# Patient Record
Sex: FEMALE | Race: BLACK OR AFRICAN AMERICAN | NOT HISPANIC OR LATINO
[De-identification: names, ages, dates, MRNs, and addresses within clinical notes are randomized per-mention and may not be internally consistent; named-entity substitution may affect disease eponyms.]

---

## 2018-03-07 PROBLEM — Z00.00 ENCOUNTER FOR PREVENTIVE HEALTH EXAMINATION: Status: ACTIVE | Noted: 2018-03-07

## 2018-04-23 ENCOUNTER — RECORD ABSTRACTING (OUTPATIENT)
Age: 63
End: 2018-04-23

## 2018-04-25 ENCOUNTER — APPOINTMENT (OUTPATIENT)
Dept: CARDIOLOGY | Facility: CLINIC | Age: 63
End: 2018-04-25
Payer: COMMERCIAL

## 2018-04-25 ENCOUNTER — NON-APPOINTMENT (OUTPATIENT)
Age: 63
End: 2018-04-25

## 2018-04-25 VITALS
HEART RATE: 93 BPM | OXYGEN SATURATION: 98 % | DIASTOLIC BLOOD PRESSURE: 80 MMHG | BODY MASS INDEX: 30 KG/M2 | WEIGHT: 163 LBS | SYSTOLIC BLOOD PRESSURE: 116 MMHG | HEIGHT: 62 IN

## 2018-04-25 DIAGNOSIS — Z80.3 FAMILY HISTORY OF MALIGNANT NEOPLASM OF BREAST: ICD-10-CM

## 2018-04-25 DIAGNOSIS — Z82.49 FAMILY HISTORY OF ISCHEMIC HEART DISEASE AND OTHER DISEASES OF THE CIRCULATORY SYSTEM: ICD-10-CM

## 2018-04-25 PROCEDURE — 93000 ELECTROCARDIOGRAM COMPLETE: CPT

## 2018-04-25 PROCEDURE — 99243 OFF/OP CNSLTJ NEW/EST LOW 30: CPT

## 2018-04-25 RX ORDER — ROSUVASTATIN CALCIUM 20 MG/1
20 TABLET, FILM COATED ORAL
Qty: 90 | Refills: 0 | Status: ACTIVE | COMMUNITY
Start: 2017-12-11

## 2018-04-25 RX ORDER — LISINOPRIL 10 MG/1
10 TABLET ORAL
Qty: 90 | Refills: 0 | Status: ACTIVE | COMMUNITY
Start: 2017-01-06

## 2018-04-25 RX ORDER — HYDROCHLOROTHIAZIDE 12.5 MG/1
12.5 CAPSULE ORAL
Qty: 30 | Refills: 0 | Status: ACTIVE | COMMUNITY
Start: 2017-01-06

## 2018-05-17 ENCOUNTER — APPOINTMENT (OUTPATIENT)
Dept: CARDIOLOGY | Facility: CLINIC | Age: 63
End: 2018-05-17
Payer: COMMERCIAL

## 2018-05-17 PROCEDURE — 93979 VASCULAR STUDY: CPT

## 2018-05-17 PROCEDURE — 93880 EXTRACRANIAL BILAT STUDY: CPT

## 2018-05-17 PROCEDURE — 93306 TTE W/DOPPLER COMPLETE: CPT

## 2018-05-22 ENCOUNTER — APPOINTMENT (OUTPATIENT)
Dept: CARDIOLOGY | Facility: CLINIC | Age: 63
End: 2018-05-22
Payer: COMMERCIAL

## 2018-05-22 PROCEDURE — 78452 HT MUSCLE IMAGE SPECT MULT: CPT

## 2018-05-22 PROCEDURE — A9502: CPT

## 2018-05-22 PROCEDURE — 93015 CV STRESS TEST SUPVJ I&R: CPT

## 2018-05-23 ENCOUNTER — APPOINTMENT (OUTPATIENT)
Dept: CARDIOLOGY | Facility: CLINIC | Age: 63
End: 2018-05-23
Payer: COMMERCIAL

## 2018-05-23 VITALS
BODY MASS INDEX: 29.81 KG/M2 | WEIGHT: 162 LBS | DIASTOLIC BLOOD PRESSURE: 78 MMHG | HEIGHT: 62 IN | HEART RATE: 80 BPM | SYSTOLIC BLOOD PRESSURE: 128 MMHG

## 2018-05-23 PROCEDURE — 99214 OFFICE O/P EST MOD 30 MIN: CPT

## 2019-05-22 ENCOUNTER — APPOINTMENT (OUTPATIENT)
Dept: CARDIOLOGY | Facility: CLINIC | Age: 64
End: 2019-05-22

## 2019-07-11 ENCOUNTER — APPOINTMENT (OUTPATIENT)
Dept: CARDIOLOGY | Facility: CLINIC | Age: 64
End: 2019-07-11
Payer: COMMERCIAL

## 2019-07-11 ENCOUNTER — NON-APPOINTMENT (OUTPATIENT)
Age: 64
End: 2019-07-11

## 2019-07-11 VITALS
BODY MASS INDEX: 29.45 KG/M2 | HEART RATE: 100 BPM | SYSTOLIC BLOOD PRESSURE: 114 MMHG | OXYGEN SATURATION: 98 % | DIASTOLIC BLOOD PRESSURE: 74 MMHG | WEIGHT: 161 LBS

## 2019-07-11 PROCEDURE — 99214 OFFICE O/P EST MOD 30 MIN: CPT

## 2019-07-11 PROCEDURE — 93000 ELECTROCARDIOGRAM COMPLETE: CPT

## 2019-07-11 NOTE — PHYSICAL EXAM
[General Appearance - Well Developed] : well developed [Normal Appearance] : normal appearance [Well Groomed] : well groomed [General Appearance - Well Nourished] : well nourished [No Deformities] : no deformities [General Appearance - In No Acute Distress] : no acute distress [Normal Conjunctiva] : the conjunctiva exhibited no abnormalities [Eyelids - No Xanthelasma] : the eyelids demonstrated no xanthelasmas [Normal Oral Mucosa] : normal oral mucosa [No Oral Pallor] : no oral pallor [No Oral Cyanosis] : no oral cyanosis [Normal Jugular Venous V Waves Present] : normal jugular venous V waves present [Normal Jugular Venous A Waves Present] : normal jugular venous A waves present [No Jugular Venous Valentin A Waves] : no jugular venous valentin A waves [Respiration, Rhythm And Depth] : normal respiratory rhythm and effort [Auscultation Breath Sounds / Voice Sounds] : lungs were clear to auscultation bilaterally [Exaggerated Use Of Accessory Muscles For Inspiration] : no accessory muscle use [Heart Rate And Rhythm] : heart rate and rhythm were normal [Murmurs] : no murmurs present [Heart Sounds] : normal S1 and S2 [Abdomen Soft] : soft [Abdomen Tenderness] : non-tender [Abdomen Mass (___ Cm)] : no abdominal mass palpated [Abnormal Walk] : normal gait [Gait - Sufficient For Exercise Testing] : the gait was sufficient for exercise testing [Nail Clubbing] : no clubbing of the fingernails [Cyanosis, Localized] : no localized cyanosis [Petechial Hemorrhages (___cm)] : no petechial hemorrhages [Skin Color & Pigmentation] : normal skin color and pigmentation [] : no rash [No Venous Stasis] : no venous stasis [Skin Lesions] : no skin lesions [No Skin Ulcers] : no skin ulcer [No Xanthoma] : no  xanthoma was observed [Oriented To Time, Place, And Person] : oriented to person, place, and time [Affect] : the affect was normal [Mood] : the mood was normal [No Anxiety] : not feeling anxious

## 2019-07-11 NOTE — ASSESSMENT
[FreeTextEntry1] : Yvonne is a 64-year-old female with medical history detailed above and active medical issues including:\par \par - No anginal symptoms, normal perfusion Myoview stress test normal LVEF May 2018 \par \par - Hypertension at BP goal less than 130/80 on HCTZ lisinopril\par \par - Dyslipidemia on rosuvastatin well tolerated\par \par - Type 2 diabetes on  Kombiglyze\par \par - Chronic joint pain on the Motrin\par \par Patient will be seen in cardiology follow-up one year at patient request. Current cardiac medications remain unchanged. Repeat labs will be ordered with PMD.\par \par Yvonne will followup with Dr Eagle Mercado for primary care.\par \par

## 2019-07-11 NOTE — REASON FOR VISIT
[Consultation] : a consultation regarding [Hyperlipidemia] : hyperlipidemia [Hypertension] : hypertension [FreeTextEntry1] : Yvonne is 64-year-old female with history of hypertension, dyslipidemia, type 2 diabetes, GERD, chest pain evaluation 2011 normal MPI stress test at that time.\par \par No further chest pain over the past year. Cardiovascular review of symptoms is negative for exertional chest pain, dyspnea, palpitations, dizziness or syncope.  No PND or orthopnea leg edema.  No bleeding or black stool.\par \par Patient is walking 15 minutes with exertional chest pain.  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 \par \par Exercise Myoview stress test May 2018, LVEF 60%, normal perfusion, no ischemic EKG response, no chest pain, 103% MPHR, 5 minutes Corbin protocol.\par \par Echocardiogram May 2018, LVEF 60%, mild MR, mild to moderate TR, mild pulmonary hypertension, RVSP 35 mmHg\par \par Carotid and abdominal ultrasound May 2018, mild nonobstructive plaque, normal abdominal aortic size.\par \par EKG 4/25/18, sinus rhythm, low voltage, NSST\par \par Exercise Myoview stress test 2011 normal perfusion\par \par \par  [FreeTextEntry2] : T2DM

## 2019-09-05 ENCOUNTER — APPOINTMENT (OUTPATIENT)
Dept: CARDIOLOGY | Facility: CLINIC | Age: 64
End: 2019-09-05
Payer: COMMERCIAL

## 2019-09-05 PROCEDURE — 93306 TTE W/DOPPLER COMPLETE: CPT

## 2019-09-09 ENCOUNTER — APPOINTMENT (OUTPATIENT)
Dept: CARDIOLOGY | Facility: CLINIC | Age: 64
End: 2019-09-09

## 2020-07-15 ENCOUNTER — NON-APPOINTMENT (OUTPATIENT)
Age: 65
End: 2020-07-15

## 2020-07-15 ENCOUNTER — APPOINTMENT (OUTPATIENT)
Dept: CARDIOLOGY | Facility: CLINIC | Age: 65
End: 2020-07-15
Payer: COMMERCIAL

## 2020-07-15 VITALS
HEART RATE: 78 BPM | TEMPERATURE: 98.3 F | HEIGHT: 62 IN | SYSTOLIC BLOOD PRESSURE: 110 MMHG | WEIGHT: 155 LBS | BODY MASS INDEX: 28.52 KG/M2 | DIASTOLIC BLOOD PRESSURE: 70 MMHG | OXYGEN SATURATION: 100 %

## 2020-07-15 PROCEDURE — 99214 OFFICE O/P EST MOD 30 MIN: CPT

## 2020-07-15 PROCEDURE — 93000 ELECTROCARDIOGRAM COMPLETE: CPT

## 2020-07-15 RX ORDER — SAXAGLIPTIN AND METFORMIN HYDROCHLORIDE 2.5; 1 MG/1; MG/1
2.5-1 TABLET, FILM COATED, EXTENDED RELEASE ORAL
Qty: 180 | Refills: 0 | Status: DISCONTINUED | COMMUNITY
Start: 2017-01-06 | End: 2020-07-15

## 2020-07-15 NOTE — PHYSICAL EXAM
[General Appearance - Well Developed] : well developed [Normal Appearance] : normal appearance [General Appearance - Well Nourished] : well nourished [No Deformities] : no deformities [Well Groomed] : well groomed [Normal Conjunctiva] : the conjunctiva exhibited no abnormalities [General Appearance - In No Acute Distress] : no acute distress [Normal Oral Mucosa] : normal oral mucosa [Eyelids - No Xanthelasma] : the eyelids demonstrated no xanthelasmas [No Oral Pallor] : no oral pallor [No Oral Cyanosis] : no oral cyanosis [Normal Jugular Venous A Waves Present] : normal jugular venous A waves present [No Jugular Venous Valentin A Waves] : no jugular venous valentin A waves [Normal Jugular Venous V Waves Present] : normal jugular venous V waves present [Exaggerated Use Of Accessory Muscles For Inspiration] : no accessory muscle use [Respiration, Rhythm And Depth] : normal respiratory rhythm and effort [Heart Rate And Rhythm] : heart rate and rhythm were normal [Auscultation Breath Sounds / Voice Sounds] : lungs were clear to auscultation bilaterally [Heart Sounds] : normal S1 and S2 [Abdomen Soft] : soft [Murmurs] : no murmurs present [Abdomen Tenderness] : non-tender [Gait - Sufficient For Exercise Testing] : the gait was sufficient for exercise testing [Abdomen Mass (___ Cm)] : no abdominal mass palpated [Abnormal Walk] : normal gait [Nail Clubbing] : no clubbing of the fingernails [Cyanosis, Localized] : no localized cyanosis [Petechial Hemorrhages (___cm)] : no petechial hemorrhages [Skin Color & Pigmentation] : normal skin color and pigmentation [] : no rash [No Venous Stasis] : no venous stasis [Skin Lesions] : no skin lesions [No Skin Ulcers] : no skin ulcer [No Xanthoma] : no  xanthoma was observed [Oriented To Time, Place, And Person] : oriented to person, place, and time [Affect] : the affect was normal [Mood] : the mood was normal [No Anxiety] : not feeling anxious

## 2020-07-15 NOTE — ASSESSMENT
[FreeTextEntry1] : Yvonne is a 65-year-old female with medical history detailed above and active medical issues including:\par \par - No anginal symptoms, normal perfusion Myoview stress test normal LVEF May 2018.  In the setting of Covid 19 pandemic we will discuss the need for stress testing next office visit.\par \par - Hypertension at BP goal less than 130/80 on HCTZ lisinopril\par \par - Dyslipidemia on rosuvastatin well tolerated\par \par - Type 2 diabetes on  Kombiglyze\par \par - Chronic joint pain on the Motrin\par \par Patient will be seen in cardiology follow-up 6 months.  Patient will have 2-D echocardiogram to assess for  LV systolic function, wall motion and  structural heart disease.  Carotid and abdominal ultrasound to assess for obstructive PAD.  \par \par Current cardiac medications remain unchanged. Repeat labs will be ordered with PMD.\par \par Yvonne will followup with Talat Lane for primary care.\par \par

## 2020-07-15 NOTE — REASON FOR VISIT
[Hyperlipidemia] : hyperlipidemia [Consultation] : a consultation regarding [Hypertension] : hypertension [FreeTextEntry1] : Yvonne is 65-year-old female with history of hypertension, dyslipidemia, type 2 diabetes, GERD, chest pain evaluation 2011 normal MPI stress test at that time.\par \par No further chest pain over the past year. Cardiovascular review of symptoms is negative for exertional chest pain, dyspnea, palpitations, dizziness or syncope.  No PND or orthopnea leg edema.  No bleeding or black stool.\par \par Patient is walking 15 minutes with exertional chest pain.  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 \par \par Echocardiogram September 2019, LVEF 55%, mild MR and TR, normal RVSP\par \par Exercise Myoview stress test May 2018, LVEF 60%, normal perfusion, no ischemic EKG response, no chest pain, 103% MPHR, 5 minutes Corbin protocol.\par \par Echocardiogram May 2018, LVEF 60%, mild MR, mild to moderate TR, mild pulmonary hypertension, RVSP 35 mmHg\par \par Carotid and abdominal ultrasound May 2018, mild nonobstructive plaque, normal abdominal aortic size.\par \par EKG 4/25/18, sinus rhythm, low voltage, NSST\par \par Exercise Myoview stress test 2011 normal perfusion\par \par \par  [FreeTextEntry2] : HTN, HL, T2DM, ACP

## 2021-01-06 ENCOUNTER — APPOINTMENT (OUTPATIENT)
Dept: CARDIOLOGY | Facility: CLINIC | Age: 66
End: 2021-01-06
Payer: COMMERCIAL

## 2021-01-06 PROCEDURE — 93306 TTE W/DOPPLER COMPLETE: CPT

## 2021-01-06 PROCEDURE — 99072 ADDL SUPL MATRL&STAF TM PHE: CPT

## 2021-01-06 PROCEDURE — 93880 EXTRACRANIAL BILAT STUDY: CPT

## 2021-01-06 PROCEDURE — 93979 VASCULAR STUDY: CPT

## 2021-01-12 ENCOUNTER — APPOINTMENT (OUTPATIENT)
Dept: CARDIOLOGY | Facility: CLINIC | Age: 66
End: 2021-01-12
Payer: COMMERCIAL

## 2021-01-12 VITALS
BODY MASS INDEX: 30.18 KG/M2 | HEIGHT: 62 IN | HEART RATE: 89 BPM | WEIGHT: 164 LBS | OXYGEN SATURATION: 96 % | SYSTOLIC BLOOD PRESSURE: 138 MMHG | DIASTOLIC BLOOD PRESSURE: 70 MMHG

## 2021-01-12 PROCEDURE — 99214 OFFICE O/P EST MOD 30 MIN: CPT

## 2021-01-12 PROCEDURE — 99072 ADDL SUPL MATRL&STAF TM PHE: CPT

## 2021-01-12 NOTE — REASON FOR VISIT
[Consultation] : a consultation regarding [Hyperlipidemia] : hyperlipidemia [Hypertension] : hypertension [FreeTextEntry2] : HTN, HL, T2DM, ACP [FreeTextEntry1] : Yvonne is 65-year-old female with history of hypertension, dyslipidemia, type 2 diabetes, GERD, chest pain evaluation 2011 normal MPI stress test at that time.\par \par No further chest pain over the past year. Cardiovascular review of symptoms is negative for exertional chest pain, dyspnea, palpitations, dizziness or syncope.  No PND or orthopnea leg edema.  No bleeding or black stool.\par \par Patient is walking 15 minutes with exertional chest pain.  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 \par \par Echocardiogram Jan 2021, LVEF 55%, mild MR, normal RVSP\par \par Carotid Doppler Jan 2021, R ICA 50 to 69% stenosis, LICA nonobstructive, normal abdominal aortic size\par \par Echocardiogram September 2019, LVEF 55%, mild MR and TR, normal RVSP\par \par Exercise Myoview stress test May 2018, LVEF 60%, normal perfusion, no ischemic EKG response, no chest pain, 103% MPHR, 5 minutes Corbin protocol.\par \par Echocardiogram May 2018, LVEF 60%, mild MR, mild to moderate TR, mild pulmonary hypertension, RVSP 35 mmHg\par \par Carotid and abdominal ultrasound May 2018, mild nonobstructive plaque, normal abdominal aortic size.\par \par EKG 4/25/18, sinus rhythm, low voltage, NSST\par \par Exercise Myoview stress test 2011 normal perfusion\par \par \par

## 2021-01-12 NOTE — PHYSICAL EXAM
[General Appearance - Well Developed] : well developed [Normal Appearance] : normal appearance [Well Groomed] : well groomed [General Appearance - Well Nourished] : well nourished [No Deformities] : no deformities [General Appearance - In No Acute Distress] : no acute distress [Normal Conjunctiva] : the conjunctiva exhibited no abnormalities [Eyelids - No Xanthelasma] : the eyelids demonstrated no xanthelasmas [Normal Oral Mucosa] : normal oral mucosa [No Oral Pallor] : no oral pallor [No Oral Cyanosis] : no oral cyanosis [Normal Jugular Venous A Waves Present] : normal jugular venous A waves present [Normal Jugular Venous V Waves Present] : normal jugular venous V waves present [No Jugular Venous Valentin A Waves] : no jugular venous valentin A waves [Respiration, Rhythm And Depth] : normal respiratory rhythm and effort [Exaggerated Use Of Accessory Muscles For Inspiration] : no accessory muscle use [Auscultation Breath Sounds / Voice Sounds] : lungs were clear to auscultation bilaterally [Heart Rate And Rhythm] : heart rate and rhythm were normal [Heart Sounds] : normal S1 and S2 [Murmurs] : no murmurs present [Abdomen Soft] : soft [Abdomen Tenderness] : non-tender [Abdomen Mass (___ Cm)] : no abdominal mass palpated [Abnormal Walk] : normal gait [Gait - Sufficient For Exercise Testing] : the gait was sufficient for exercise testing [Nail Clubbing] : no clubbing of the fingernails [Cyanosis, Localized] : no localized cyanosis [Petechial Hemorrhages (___cm)] : no petechial hemorrhages [Skin Color & Pigmentation] : normal skin color and pigmentation [] : no rash [No Venous Stasis] : no venous stasis [Skin Lesions] : no skin lesions [No Skin Ulcers] : no skin ulcer [No Xanthoma] : no  xanthoma was observed [Oriented To Time, Place, And Person] : oriented to person, place, and time [Affect] : the affect was normal [Mood] : the mood was normal [No Anxiety] : not feeling anxious

## 2021-01-12 NOTE — ASSESSMENT
[FreeTextEntry1] : Yvonne is a 65-year-old female with medical history detailed above and active medical issues including:\par \par - No anginal symptoms, normal perfusion Myoview stress test normal LVEF May 2018.  In the setting of Covid 19 pandemic we will discuss the need for stress testing next office visit.\par \par - Hypertension at BP goal less than 130/80 on HCTZ lisinopril\par \par - Dyslipidemia on rosuvastatin well tolerated\par \par - Type 2 diabetes on  Kombiglyze\par \par -  Asymptomatic moderate BLANE stenosis Doppler January 2021.  Contrast CTA carotids ordered. \par \par - Chronic joint pain on the Motrin\par \par Patient will be seen in cardiology follow-up 6 months.  \par \par Current cardiac medications remain unchanged. Repeat labs will be ordered with PMD.\par \par Advised patient to follow active lifestyle with regular cardiovascular exercise. Patient educated on lifestyle and diet modification with low sodium low fat diet and avoidance of excessive alcohol. Patient is aware to call with any symptoms or concerns.\par \par Yvonne will followup with Talat Lane for primary care.\par \par

## 2021-02-10 ENCOUNTER — NON-APPOINTMENT (OUTPATIENT)
Age: 66
End: 2021-02-10

## 2021-07-08 ENCOUNTER — APPOINTMENT (OUTPATIENT)
Dept: CARDIOLOGY | Facility: CLINIC | Age: 66
End: 2021-07-08
Payer: COMMERCIAL

## 2021-07-08 ENCOUNTER — NON-APPOINTMENT (OUTPATIENT)
Age: 66
End: 2021-07-08

## 2021-07-08 VITALS
HEART RATE: 81 BPM | OXYGEN SATURATION: 99 % | WEIGHT: 162 LBS | DIASTOLIC BLOOD PRESSURE: 70 MMHG | SYSTOLIC BLOOD PRESSURE: 114 MMHG | TEMPERATURE: 98.2 F | HEIGHT: 62 IN | BODY MASS INDEX: 29.81 KG/M2

## 2021-07-08 PROCEDURE — 93000 ELECTROCARDIOGRAM COMPLETE: CPT

## 2021-07-08 PROCEDURE — 99214 OFFICE O/P EST MOD 30 MIN: CPT

## 2021-07-08 PROCEDURE — 99072 ADDL SUPL MATRL&STAF TM PHE: CPT

## 2021-07-08 NOTE — ASSESSMENT
[FreeTextEntry1] : Yvonne is a 66-year-old female with medical history detailed above and active medical issues including:\par \par - No anginal symptoms, normal perfusion Myoview stress test normal LVEF May 2018.  In the setting of Covid 19 pandemic we will discuss the need for stress testing next office visit.\par \par - Hypertension at guideline goal on HCTZ lisinopril\par \par - Dyslipidemia on rosuvastatin well tolerated\par \par - Type 2 diabetes on  Kombiglyze\par \par -  Asymptomatic moderate BLAEN stenosis Doppler January 2021.  Nonobstructive carotid on contrast CTA February 2021, incidental finding left multiple thyroid nodules, thyroid ultrasound and follow-up with endocrinologist Dr. Ivette Rodriguez ordered\par \par - Chronic joint pain on the Motrin\par \par Patient will be seen in cardiology follow-up 6 months.  We will discuss the need for stress testing next office visit.\par \par Current cardiac medications remain unchanged. Repeat labs will be ordered with PMD.\par \par Advised patient to follow active lifestyle with regular cardiovascular exercise. Patient educated on lifestyle and diet modification with low sodium low fat diet and avoidance of excessive alcohol. Patient is aware to call with any symptoms or concerns.\par \par Yvonne will followup with Talat Lane for primary care.\par \par

## 2021-07-08 NOTE — PHYSICAL EXAM
[General Appearance - Well Developed] : well developed [Normal Appearance] : normal appearance [Well Groomed] : well groomed [General Appearance - Well Nourished] : well nourished [No Deformities] : no deformities [General Appearance - In No Acute Distress] : no acute distress [Eyelids - No Xanthelasma] : the eyelids demonstrated no xanthelasmas [Normal Oral Mucosa] : normal oral mucosa [No Oral Pallor] : no oral pallor [No Oral Cyanosis] : no oral cyanosis [Normal Jugular Venous A Waves Present] : normal jugular venous A waves present [Normal Jugular Venous V Waves Present] : normal jugular venous V waves present [No Jugular Venous Valentin A Waves] : no jugular venous valentin A waves [Respiration, Rhythm And Depth] : normal respiratory rhythm and effort [Exaggerated Use Of Accessory Muscles For Inspiration] : no accessory muscle use [Auscultation Breath Sounds / Voice Sounds] : lungs were clear to auscultation bilaterally [Heart Rate And Rhythm] : heart rate and rhythm were normal [Heart Sounds] : normal S1 and S2 [Murmurs] : no murmurs present [Abdomen Soft] : soft [Abdomen Tenderness] : non-tender [Abdomen Mass (___ Cm)] : no abdominal mass palpated [Abnormal Walk] : normal gait [Gait - Sufficient For Exercise Testing] : the gait was sufficient for exercise testing [Nail Clubbing] : no clubbing of the fingernails [Cyanosis, Localized] : no localized cyanosis [Petechial Hemorrhages (___cm)] : no petechial hemorrhages [Skin Color & Pigmentation] : normal skin color and pigmentation [] : no rash [No Venous Stasis] : no venous stasis [Skin Lesions] : no skin lesions [No Skin Ulcers] : no skin ulcer [No Xanthoma] : no  xanthoma was observed [Oriented To Time, Place, And Person] : oriented to person, place, and time [Affect] : the affect was normal [Mood] : the mood was normal [No Anxiety] : not feeling anxious [Well Developed] : well developed [Well Nourished] : well nourished [No Acute Distress] : no acute distress [Normal Conjunctiva] : normal conjunctiva [Normal Venous Pressure] : normal venous pressure [No Carotid Bruit] : no carotid bruit [Normal S1, S2] : normal S1, S2 [No Murmur] : no murmur [No Rub] : no rub [No Gallop] : no gallop [Clear Lung Fields] : clear lung fields [Good Air Entry] : good air entry [No Respiratory Distress] : no respiratory distress  [Soft] : abdomen soft [Non Tender] : non-tender [No Masses/organomegaly] : no masses/organomegaly [Normal Bowel Sounds] : normal bowel sounds [Normal Gait] : normal gait [No Edema] : no edema [No Cyanosis] : no cyanosis [No Clubbing] : no clubbing [No Varicosities] : no varicosities [No Rash] : no rash [No Skin Lesions] : no skin lesions [Moves all extremities] : moves all extremities [No Focal Deficits] : no focal deficits [Normal Speech] : normal speech [Alert and Oriented] : alert and oriented [Normal memory] : normal memory

## 2021-07-08 NOTE — REASON FOR VISIT
[Consultation] : a consultation regarding [Hyperlipidemia] : hyperlipidemia [Hypertension] : hypertension [Other: ____] : [unfilled] [FreeTextEntry1] : Yvonne is 66-year-old female with history of hypertension, dyslipidemia, type 2 diabetes, GERD, chest pain evaluation 2011 normal MPI stress test at that time.\par \par No further chest pain over the past year. Cardiovascular review of symptoms is negative for exertional chest pain, dyspnea, palpitations, dizziness or syncope.  No PND or orthopnea leg edema.  No bleeding or black stool.\par \par Patient is walking 15 minutes with exertional chest pain.  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  Patient will be spending the summer in [place] with her family. \par \par CTA carotids Feb 2021 mild nonobstructive plaque, left thyroid multiple nodules, thyroid Doppler and follow-up with endocrinologist Dr Ivette Rodriguez ordered. \par \par Echocardiogram Jan 2021, LVEF 55%, mild MR, normal RVSP\par \par Carotid Doppler Jan 2021, R ICA 50 to 69% stenosis, LICA nonobstructive, normal abdominal aortic size\par \par Echocardiogram September 2019, LVEF 55%, mild MR and TR, normal RVSP\par \par Exercise Myoview stress test May 2018, LVEF 60%, normal perfusion, no ischemic EKG response, no chest pain, 103% MPHR, 5 minutes Corbin protocol.\par \par Echocardiogram May 2018, LVEF 60%, mild MR, mild to moderate TR, mild pulmonary hypertension, RVSP 35 mmHg\par \par Carotid and abdominal ultrasound May 2018, mild nonobstructive plaque, normal abdominal aortic size.\par \par EKG 4/25/18, sinus rhythm, low voltage, NSST\par \par Exercise Myoview stress test 2011 normal perfusion\par \par \par  [FreeTextEntry2] : HTN, HL, T2DM, ACP

## 2022-01-25 ENCOUNTER — APPOINTMENT (OUTPATIENT)
Dept: CARDIOLOGY | Facility: CLINIC | Age: 67
End: 2022-01-25
Payer: COMMERCIAL

## 2022-01-25 VITALS
BODY MASS INDEX: 30.36 KG/M2 | TEMPERATURE: 97.6 F | SYSTOLIC BLOOD PRESSURE: 124 MMHG | HEIGHT: 62 IN | HEART RATE: 97 BPM | DIASTOLIC BLOOD PRESSURE: 70 MMHG | WEIGHT: 165 LBS | OXYGEN SATURATION: 100 %

## 2022-01-25 PROCEDURE — 99214 OFFICE O/P EST MOD 30 MIN: CPT

## 2022-01-25 NOTE — ASSESSMENT
[FreeTextEntry1] : Yvonne is a 66-year-old female with medical history detailed above and active medical issues including:\par \par - Recurrent left arm pain, multiple CAD risk factors.  Patient will have noninvasive testing with a Lexiscan Myoview stress test to assess for obstructive CAD,  echocardiogram for LVEF, wall motion, structural heart disease,  carotid and abdominal ultrasound to assess for obstructive PAD. \par \par - Hypertension average resting home BPs at guideline goal on HCTZ lisinopril\par \par - Dyslipidemia on rosuvastatin well tolerated\par \par - Type 2 diabetes on  Kombiglyze\par \par -  Asymptomatic moderate BLANE stenosis Doppler January 2021.  Nonobstructive carotid on contrast CTA February 2021, incidental finding left multiple thyroid nodules, thyroid ultrasound and follow-up with endocrinologist Dr. Ivette Rodriguez ordered\par \par - Chronic joint pain on the Motrin\par \par Patient will be seen in cardiology follow-up after noninvasive testing.  \par \par Current cardiac medications remain unchanged. Repeat labs will be ordered with PMD.\par \par Advised patient to follow active lifestyle with regular cardiovascular exercise. Patient educated on lifestyle and diet modification with low sodium low fat diet and avoidance of excessive alcohol. Patient is aware to call with any symptoms or concerns.\par \par Yvonne will followup with Talat Lane for primary care.\par \par

## 2022-01-25 NOTE — PHYSICAL EXAM
[Well Developed] : well developed [Well Nourished] : well nourished [No Acute Distress] : no acute distress [Normal Venous Pressure] : normal venous pressure [No Carotid Bruit] : no carotid bruit [Normal S1, S2] : normal S1, S2 [No Murmur] : no murmur [No Rub] : no rub [No Gallop] : no gallop [Clear Lung Fields] : clear lung fields [Good Air Entry] : good air entry [No Respiratory Distress] : no respiratory distress  [Soft] : abdomen soft [Non Tender] : non-tender [No Masses/organomegaly] : no masses/organomegaly [Normal Bowel Sounds] : normal bowel sounds [Normal Gait] : normal gait [No Edema] : no edema [No Cyanosis] : no cyanosis [No Clubbing] : no clubbing [No Varicosities] : no varicosities [No Rash] : no rash [No Skin Lesions] : no skin lesions [Moves all extremities] : moves all extremities [No Focal Deficits] : no focal deficits [Normal Speech] : normal speech [Alert and Oriented] : alert and oriented [Normal memory] : normal memory [General Appearance - Well Developed] : well developed [Normal Appearance] : normal appearance [Well Groomed] : well groomed [General Appearance - Well Nourished] : well nourished [No Deformities] : no deformities [General Appearance - In No Acute Distress] : no acute distress [Normal Conjunctiva] : the conjunctiva exhibited no abnormalities [Eyelids - No Xanthelasma] : the eyelids demonstrated no xanthelasmas [Normal Oral Mucosa] : normal oral mucosa [No Oral Pallor] : no oral pallor [No Oral Cyanosis] : no oral cyanosis [Normal Jugular Venous A Waves Present] : normal jugular venous A waves present [Normal Jugular Venous V Waves Present] : normal jugular venous V waves present [No Jugular Venous Valentin A Waves] : no jugular venous valentin A waves [Respiration, Rhythm And Depth] : normal respiratory rhythm and effort [Exaggerated Use Of Accessory Muscles For Inspiration] : no accessory muscle use [Auscultation Breath Sounds / Voice Sounds] : lungs were clear to auscultation bilaterally [Heart Rate And Rhythm] : heart rate and rhythm were normal [Heart Sounds] : normal S1 and S2 [Murmurs] : no murmurs present [Abdomen Soft] : soft [Abdomen Tenderness] : non-tender [Abdomen Mass (___ Cm)] : no abdominal mass palpated [Abnormal Walk] : normal gait [Gait - Sufficient For Exercise Testing] : the gait was sufficient for exercise testing [Nail Clubbing] : no clubbing of the fingernails [Cyanosis, Localized] : no localized cyanosis [Petechial Hemorrhages (___cm)] : no petechial hemorrhages [Skin Color & Pigmentation] : normal skin color and pigmentation [] : no rash [No Venous Stasis] : no venous stasis [Skin Lesions] : no skin lesions [No Skin Ulcers] : no skin ulcer [No Xanthoma] : no  xanthoma was observed [Oriented To Time, Place, And Person] : oriented to person, place, and time [Affect] : the affect was normal [Mood] : the mood was normal [No Anxiety] : not feeling anxious

## 2022-01-25 NOTE — REASON FOR VISIT
[Other: ____] : [unfilled] [Consultation] : a consultation regarding [Hyperlipidemia] : hyperlipidemia [Hypertension] : hypertension [FreeTextEntry1] : Yvonne is 66-year-old female with history of hypertension, dyslipidemia, type 2 diabetes, GERD, chest pain evaluation 2011 normal MPI stress test at that time.\par \par Patient has recurrent left arm pain, no further chest pain. Cardiovascular review of symptoms is negative for exertional dyspnea, palpitations, dizziness or syncope.  No PND or orthopnea leg edema.  No bleeding or black stool.\par \par No exercise routine.  Patient is walking 10 minutes on occasion. 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  Patient spends the summer in [place] with her family. \par \par CTA carotids Feb 2021 mild nonobstructive plaque, left thyroid multiple nodules, thyroid Doppler and follow-up with endocrinologist Dr Ivette Rodriguez ordered. \par \par Echocardiogram Jan 2021, LVEF 55%, mild MR, normal RVSP\par \par Carotid Doppler Jan 2021, R ICA 50 to 69% stenosis, LICA nonobstructive, normal abdominal aortic size\par \par Echocardiogram September 2019, LVEF 55%, mild MR and TR, normal RVSP\par \par Exercise Myoview stress test May 2018, LVEF 60%, normal perfusion, no ischemic EKG response, no chest pain, 103% MPHR, 5 minutes Corbin protocol.\par \par Echocardiogram May 2018, LVEF 60%, mild MR, mild to moderate TR, mild pulmonary hypertension, RVSP 35 mmHg\par \par Carotid and abdominal ultrasound May 2018, mild nonobstructive plaque, normal abdominal aortic size.\par \par EKG 4/25/18, sinus rhythm, low voltage, NSST\par \par Exercise Myoview stress test 2011 normal perfusion\par \par \par  [FreeTextEntry2] : HTN, HL, T2DM, ACP

## 2022-01-25 NOTE — REVIEW OF SYSTEMS
[Negative] : Heme/Lymph [Dyspnea on exertion] : dyspnea during exertion [FreeTextEntry5] : Left arm pain

## 2022-01-31 ENCOUNTER — APPOINTMENT (OUTPATIENT)
Dept: CARDIOLOGY | Facility: CLINIC | Age: 67
End: 2022-01-31
Payer: COMMERCIAL

## 2022-01-31 PROCEDURE — 93979 VASCULAR STUDY: CPT

## 2022-01-31 PROCEDURE — 93306 TTE W/DOPPLER COMPLETE: CPT

## 2022-01-31 PROCEDURE — 93880 EXTRACRANIAL BILAT STUDY: CPT

## 2022-02-08 ENCOUNTER — APPOINTMENT (OUTPATIENT)
Dept: CARDIOLOGY | Facility: CLINIC | Age: 67
End: 2022-02-08
Payer: COMMERCIAL

## 2022-02-08 PROCEDURE — A9502: CPT

## 2022-02-08 PROCEDURE — 93015 CV STRESS TEST SUPVJ I&R: CPT

## 2022-02-08 PROCEDURE — 78452 HT MUSCLE IMAGE SPECT MULT: CPT

## 2022-02-17 ENCOUNTER — APPOINTMENT (OUTPATIENT)
Dept: CARDIOLOGY | Facility: CLINIC | Age: 67
End: 2022-02-17
Payer: COMMERCIAL

## 2022-02-17 VITALS
WEIGHT: 170 LBS | SYSTOLIC BLOOD PRESSURE: 114 MMHG | OXYGEN SATURATION: 99 % | BODY MASS INDEX: 31.28 KG/M2 | DIASTOLIC BLOOD PRESSURE: 68 MMHG | HEIGHT: 62 IN | TEMPERATURE: 97.5 F | HEART RATE: 99 BPM

## 2022-02-17 PROCEDURE — 99214 OFFICE O/P EST MOD 30 MIN: CPT

## 2022-02-17 NOTE — REASON FOR VISIT
[Other: ____] : [unfilled] [Consultation] : a consultation regarding [Hyperlipidemia] : hyperlipidemia [Hypertension] : hypertension [FreeTextEntry1] : Yvonne is 67-year-old female with history of hypertension, dyslipidemia, type 2 diabetes, GERD, chest pain evaluation 2011 normal MPI stress test at that time.\par \par Patient has recurrent left arm pain, no further chest pain. Cardiovascular review of symptoms is negative for exertional dyspnea, palpitations, dizziness or syncope.  No PND or orthopnea leg edema.  No bleeding or black stool.\par \par No exercise routine.  Patient is walking 10 minutes on occasion. 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  Patient spends the summer in [place] with her family.  Patient is planning detention this year.\par \par Lexiscan Myoview stress test Feb 2022, LVEF 66%, normal perfusion, diaphragm attenuation seen, nonischemic EKG response, baseline sinus rhythm NSST\par \par Echocardiogram Jan 2022 LVEF 55%, mild MR and TR, normal RVSP, ascending aorta 3.5 cm.\par \par Carotid and abdominal ultrasound Jan 2022, mild nonobstructive plaque, normal abdominal aortic size. \par \par CTA carotids Feb 2021 mild nonobstructive plaque, left thyroid multiple nodules, thyroid Doppler and follow-up with endocrinologist Dr Ivette Rodriguez ordered. \par \par Echocardiogram Jan 2021, LVEF 55%, mild MR, normal RVSP\par \par Carotid Doppler Jan 2021, R ICA 50 to 69% stenosis, LICA nonobstructive, normal abdominal aortic size\par \par Echocardiogram September 2019, LVEF 55%, mild MR and TR, normal RVSP\par \par Exercise Myoview stress test May 2018, LVEF 60%, normal perfusion, no ischemic EKG response, no chest pain, 103% MPHR, 5 minutes Corbin protocol.\par \par Echocardiogram May 2018, LVEF 60%, mild MR, mild to moderate TR, mild pulmonary hypertension, RVSP 35 mmHg\par \par Carotid and abdominal ultrasound May 2018, mild nonobstructive plaque, normal abdominal aortic size.\par \par EKG 4/25/18, sinus rhythm, low voltage, NSST\par \par Exercise Myoview stress test 2011 normal perfusion\par \par \par  [FreeTextEntry2] : HTN, HL, T2DM, ACP

## 2022-02-17 NOTE — PHYSICAL EXAM
[Well Developed] : well developed [Well Nourished] : well nourished [No Acute Distress] : no acute distress [Normal Venous Pressure] : normal venous pressure [No Carotid Bruit] : no carotid bruit [Normal S1, S2] : normal S1, S2 [No Murmur] : no murmur [No Rub] : no rub [Clear Lung Fields] : clear lung fields [No Gallop] : no gallop [Good Air Entry] : good air entry [No Respiratory Distress] : no respiratory distress  [Soft] : abdomen soft [Non Tender] : non-tender [No Masses/organomegaly] : no masses/organomegaly [Normal Bowel Sounds] : normal bowel sounds [Normal Gait] : normal gait [No Edema] : no edema [No Cyanosis] : no cyanosis [No Clubbing] : no clubbing [No Varicosities] : no varicosities [No Rash] : no rash [No Skin Lesions] : no skin lesions [Moves all extremities] : moves all extremities [No Focal Deficits] : no focal deficits [Normal Speech] : normal speech [Alert and Oriented] : alert and oriented [Normal memory] : normal memory [General Appearance - Well Developed] : well developed [Normal Appearance] : normal appearance [Well Groomed] : well groomed [General Appearance - Well Nourished] : well nourished [No Deformities] : no deformities [General Appearance - In No Acute Distress] : no acute distress [Normal Conjunctiva] : the conjunctiva exhibited no abnormalities [Eyelids - No Xanthelasma] : the eyelids demonstrated no xanthelasmas [Normal Oral Mucosa] : normal oral mucosa [No Oral Pallor] : no oral pallor [No Oral Cyanosis] : no oral cyanosis [Normal Jugular Venous A Waves Present] : normal jugular venous A waves present [Normal Jugular Venous V Waves Present] : normal jugular venous V waves present [No Jugular Venous Valentin A Waves] : no jugular venous valentin A waves [Respiration, Rhythm And Depth] : normal respiratory rhythm and effort [Exaggerated Use Of Accessory Muscles For Inspiration] : no accessory muscle use [Auscultation Breath Sounds / Voice Sounds] : lungs were clear to auscultation bilaterally [Heart Rate And Rhythm] : heart rate and rhythm were normal [Heart Sounds] : normal S1 and S2 [Murmurs] : no murmurs present [Abdomen Soft] : soft [Abdomen Tenderness] : non-tender [Abdomen Mass (___ Cm)] : no abdominal mass palpated [Abnormal Walk] : normal gait [Gait - Sufficient For Exercise Testing] : the gait was sufficient for exercise testing [Nail Clubbing] : no clubbing of the fingernails [Cyanosis, Localized] : no localized cyanosis [Petechial Hemorrhages (___cm)] : no petechial hemorrhages [Skin Color & Pigmentation] : normal skin color and pigmentation [] : no rash [No Venous Stasis] : no venous stasis [Skin Lesions] : no skin lesions [No Skin Ulcers] : no skin ulcer [No Xanthoma] : no  xanthoma was observed [Oriented To Time, Place, And Person] : oriented to person, place, and time [Affect] : the affect was normal [Mood] : the mood was normal [No Anxiety] : not feeling anxious

## 2022-02-17 NOTE — ASSESSMENT
[FreeTextEntry1] : Yvonne is a 67-year-old female with medical history detailed above and active medical issues including:\par \par - Atypical chest pain resolved, normal perfusion Myoview stress test with normal LVEF Feb 2022\par \par - Hypertension average resting home BPs at guideline goal on HCTZ lisinopril\par \par - Dyslipidemia on rosuvastatin well tolerated\par \par - Type 2 diabetes on  Kombiglyze\par \par -  Asymptomatic moderate BLANE stenosis Doppler January 2021.  Nonobstructive carotid on contrast CTA February 2021, incidental finding left multiple thyroid nodules, thyroid ultrasound and follow-up with endocrinologist Dr. Ivette Rodriguez ordered\par \par - Chronic joint pain on the Motrin\par \par Patient will be seen in cardiology follow-up 6 months\par \par \par Current cardiac medications remain unchanged. Repeat labs will be ordered with PMD.\par \par Advised patient to follow active lifestyle with regular cardiovascular exercise. Patient educated on lifestyle and diet modification with low sodium low fat diet and avoidance of excessive alcohol. Patient is aware to call with any symptoms or concerns.\par \par Yvonne will followup with Talat Lane for primary care.\par \par

## 2022-02-17 NOTE — REVIEW OF SYSTEMS
[Dyspnea on exertion] : dyspnea during exertion [Negative] : Heme/Lymph [FreeTextEntry5] : Left arm pain

## 2022-03-11 ENCOUNTER — NON-APPOINTMENT (OUTPATIENT)
Age: 67
End: 2022-03-11

## 2022-08-23 ENCOUNTER — APPOINTMENT (OUTPATIENT)
Dept: CARDIOLOGY | Facility: CLINIC | Age: 67
End: 2022-08-23

## 2022-08-23 ENCOUNTER — NON-APPOINTMENT (OUTPATIENT)
Age: 67
End: 2022-08-23

## 2022-08-23 VITALS
BODY MASS INDEX: 30.91 KG/M2 | HEIGHT: 62 IN | OXYGEN SATURATION: 99 % | SYSTOLIC BLOOD PRESSURE: 118 MMHG | HEART RATE: 97 BPM | WEIGHT: 168 LBS | DIASTOLIC BLOOD PRESSURE: 62 MMHG

## 2022-08-23 PROCEDURE — 93242 EXT ECG>48HR<7D RECORDING: CPT | Mod: 59

## 2022-08-23 PROCEDURE — 93000 ELECTROCARDIOGRAM COMPLETE: CPT | Mod: 59

## 2022-08-23 PROCEDURE — 99214 OFFICE O/P EST MOD 30 MIN: CPT | Mod: 25

## 2022-08-23 NOTE — HISTORY OF PRESENT ILLNESS
[FreeTextEntry1] : JEROMY LARSON is a 67 year old female with a past medical history of hypertension, dyslipidemia, type 2 diabetes, GERD, chest pain evaluation 2011 normal MPI stress test at that time.\par \par Last seen 2/17/22. In the interim there have been no hospitalizations or procedures. Notes brief palpitations. She denies chest pain, pressure, unusual shortness of breath, orthopnea, LE edema, lightheadedness, dizziness, near syncope or syncope. Never a smoker. Walks for exercise.\par \par Testing:\par \par EKG 8/23/22: SR at 94 bpm, VA interval 128ms, QTc 393ms, PRWP, nonspecific ST-T wave abnormalities \par \par Lexiscan Myoview stress test Feb 2022, LVEF 66%, normal perfusion, diaphragm attenuation seen, nonischemic EKG response, baseline sinus rhythm NSST\par \par Echo 1/31/22: EF 50-55%. Mild MR. Mild segmental LVSF. Hypokinetic apical lateral wall, hypokintetic distal inferiolateral wall. Mild TR. Minimal to mild VA. Compared with echo 1/6/21, mild segmental wall motion abnormalities noiced.\par \par Carotid and abdominal ultrasound Jan 2022, mild nonobstructive plaque, normal abdominal aortic size. \par \par Labs 6/21/21: WBC 5.79, Hgb 12.3, HCT 37.7, plt 213, A1C 6, Chol 149, Trigs 54, HDL 91, LDL 47, TSH 1.22\par \par CTA carotids Feb 2021 mild nonobstructive plaque, left thyroid multiple nodules, thyroid Doppler and follow-up with endocrinologist Dr Ivette Rodriguez ordered.

## 2022-08-23 NOTE — DISCUSSION/SUMMARY
[FreeTextEntry1] : JEROMY LARSON is a 67 year old F who presents today Aug 23, 2022 with the above history and the following active issues:\par \par Palpitations. Recommend 7 day Zio. Avoidance of triggers discussed.\par \par Fasting labs ordered.\par \par - Atypical chest pain resolved, normal perfusion Myoview stress test with normal LVEF Feb 2022\par \par - Hypertension average resting home BPs at guideline goal on HCTZ lisinopril\par \par - Dyslipidemia on rosuvastatin well tolerated\par \par - Type 2 diabetes on Kombiglyze\par \par - Asymptomatic moderate BLANE stenosis Doppler January 2021. Nonobstructive carotid on contrast CTA February 2021, incidental finding left multiple thyroid nodules, thyroid ultrasound and follow-up with endocrinologist Dr. Ivette Rodriguez ordered\par \par - Chronic joint pain on the Motrin\par \par Ongoing f/u with PCP.\par \par F/U after testing to review results (labs and Zio).\par Discussed red flag symptoms, which would warrant sooner or emergent medical evaluation.\par Any questions and concerns were addressed and resolved.\par \par Sincerely,\par Demi Mendoza [facility]-BC\par Patient's history, testing, and plan was reviewed with supervising physician, Dr. Nikia Bolanos

## 2022-09-20 ENCOUNTER — APPOINTMENT (OUTPATIENT)
Dept: CARDIOLOGY | Facility: CLINIC | Age: 67
End: 2022-09-20

## 2022-09-20 VITALS
OXYGEN SATURATION: 97 % | WEIGHT: 165 LBS | HEART RATE: 93 BPM | HEIGHT: 62 IN | BODY MASS INDEX: 30.36 KG/M2 | SYSTOLIC BLOOD PRESSURE: 108 MMHG | TEMPERATURE: 97.8 F | DIASTOLIC BLOOD PRESSURE: 66 MMHG

## 2022-09-20 PROCEDURE — 99215 OFFICE O/P EST HI 40 MIN: CPT

## 2022-09-20 NOTE — ASSESSMENT
[FreeTextEntry1] : Yvonne is a 67-year-old female with medical history detailed above and active medical issues including:\par \par - Atypical chest pain resolved, normal perfusion Myoview stress test with normal LVEF Feb 2022\par \par - Hypertension average resting home BPs at guideline goal on HCTZ lisinopril\par \par - Dyslipidemia on rosuvastatin well tolerated\par \par - Type 2 diabetes on  Kombiglyze\par \par -  Asymptomatic moderate BLANE stenosis Doppler January 2021.  Nonobstructive carotid on contrast CTA February 2021, incidental finding left multiple thyroid nodules, thyroid ultrasound and follow-up with endocrinologist Dr. Ivette Rodriguez ordered\par \par - Chronic joint pain on the Motrin\par \par Patient will be seen in cardiology follow-up 6 months same-day echocardiogram\par \par Current cardiac medications remain unchanged. Repeat labs will be ordered with PMD.\par \par Advised patient to follow active lifestyle with regular cardiovascular exercise. Patient educated on lifestyle and diet modification with low sodium low fat diet and avoidance of excessive alcohol. Patient is aware to call with any symptoms or concerns.\par \par Yvonne will followup with Talat Lane for primary care.\par \par

## 2022-09-20 NOTE — REASON FOR VISIT
[Other: ____] : [unfilled] [Consultation] : a consultation regarding [Hyperlipidemia] : hyperlipidemia [Hypertension] : hypertension [FreeTextEntry1] : Yvonne is 67-year-old female with history of hypertension, dyslipidemia, type 2 diabetes, GERD, chest pain evaluation 2011 normal MPI stress test at that time.\par \par No further chest pain. Cardiovascular review of symptoms is negative for exertional dyspnea, palpitations, dizziness or syncope.  No PND or orthopnea leg edema.  No bleeding or black stool.\par \par No exercise routine.  Patient is walking 10 minutes on occasion. Patient was working with geriatric patients in a  center, now retired.  Patient spends the summer in [place] with her family.  Patient is planning [employer]DC this year.\par \par Zio patch 1 week heart monitor Aug 2022 sinus rhythm average heart rate 78 bpm, rare PACs PVCs, 4 beat WCT\par \par Lexiscan Myoview stress test Feb 2022, LVEF 66%, normal perfusion, diaphragm attenuation seen, nonischemic EKG response, baseline sinus rhythm NSST\par \par Echocardiogram Jan 2022 LVEF 55%, mild MR and TR, normal RVSP, ascending aorta 3.5 cm.\par \par Carotid and abdominal ultrasound Jan 2022, mild nonobstructive plaque, normal abdominal aortic size. \par \par CTA carotids Feb 2021 mild nonobstructive plaque, left thyroid multiple nodules, thyroid Doppler and follow-up with endocrinologist Dr Ivette Rodriguez ordered. \par \par Echocardiogram Jan 2021, LVEF 55%, mild MR, normal RVSP\par \par Carotid Doppler Jan 2021, R ICA 50 to 69% stenosis, LICA nonobstructive, normal abdominal aortic size\par \par Echocardiogram September 2019, LVEF 55%, mild MR and TR, normal RVSP\par \par Exercise Myoview stress test May 2018, LVEF 60%, normal perfusion, no ischemic EKG response, no chest pain, 103% MPHR, 5 minutes Corbin protocol.\par \par Echocardiogram May 2018, LVEF 60%, mild MR, mild to moderate TR, mild pulmonary hypertension, RVSP 35 mmHg\par \par Carotid and abdominal ultrasound May 2018, mild nonobstructive plaque, normal abdominal aortic size.\par \par EKG 4/25/18, sinus rhythm, low voltage, NSST\par \par Exercise Myoview stress test 2011 normal perfusion\par \par \par  [FreeTextEntry2] : HTN, HL, T2DM, ACP

## 2023-03-07 ENCOUNTER — APPOINTMENT (OUTPATIENT)
Dept: CARDIOLOGY | Facility: CLINIC | Age: 68
End: 2023-03-07

## 2023-04-20 ENCOUNTER — APPOINTMENT (OUTPATIENT)
Dept: CARDIOLOGY | Facility: CLINIC | Age: 68
End: 2023-04-20
Payer: MEDICARE

## 2023-04-20 VITALS
SYSTOLIC BLOOD PRESSURE: 114 MMHG | OXYGEN SATURATION: 99 % | WEIGHT: 162 LBS | HEIGHT: 62 IN | BODY MASS INDEX: 29.81 KG/M2 | DIASTOLIC BLOOD PRESSURE: 64 MMHG | HEART RATE: 88 BPM

## 2023-04-20 PROCEDURE — 93306 TTE W/DOPPLER COMPLETE: CPT

## 2023-04-20 PROCEDURE — 99214 OFFICE O/P EST MOD 30 MIN: CPT

## 2023-04-20 RX ORDER — ASPIRIN ENTERIC COATED TABLETS 81 MG 81 MG/1
81 TABLET, DELAYED RELEASE ORAL DAILY
Qty: 30 | Refills: 3 | Status: ACTIVE | COMMUNITY
Start: 2023-04-20 | End: 1900-01-01

## 2023-04-20 RX ORDER — ERGOCALCIFEROL 1.25 MG/1
1.25 MG CAPSULE, LIQUID FILLED ORAL
Qty: 13 | Refills: 0 | Status: ACTIVE | COMMUNITY
Start: 2022-07-15

## 2023-04-20 NOTE — HISTORY OF PRESENT ILLNESS
[FreeTextEntry1] : JEROMY LARSON is a 68 year old female with a past medical history of hypertension, dyslipidemia, type 2 diabetes, GERD, and carotid dz..\par \par Last seen 9/20/22. Presents today for echo and OV to review. See details below. In the interim there have been no hospitalizations or procedures. She denies chest pain, pressure, palpitations, unusual shortness of breath, orthopnea, LE edema, lightheadedness, dizziness, near syncope or syncope. Never a smoker. Not on a formal exercise regimen.\par \par Testing:\par \par Echo 4/20/23: CONCLUSIONS:\par 1. The left ventricular systolic function is normal with an ejection fraction visually estimated at 55 to 60\par %.\par 2. There is mild (grade 1) left ventricular diastolic dysfunction.\par 3. Normal right ventricular cavity size, normal wall thickness and normal systolic function.\par 4. The left atrium is normal.\par 5. No echocardiographic evidence of pulmonary hypertension.\par 6. No pericardial effusion seen.\par 7. Compared to the transthoracic echocardiogram performed on 1/31/2022 normal LV systolic function is\par seen.\par ____________\par \par Nuke 2/8/22: Lexiscan. Negative.\par \par Labs 8/26/22: Na 138, K 4.2, Cr 0.55, Ca 9.6, Mag 1.8, AST 21, ALT 14, Trigs 54, , LDL 45, Chol 156, CPK 61, TSH 1.06, A1V 6.8, CRP 0.09, BNP 46.4, WBC 4.47, Hgb 12.9, HCT 38.8, plt 218, LPa <10.\par \par Zio 8/23/22: -8/31/22: SR  bpm, average HR 78 bpm, 4 beats WCT. PAC burden <1%. Rare V couplets and triplets. PVC burden <1%. +sx's with SR/ST.\par \par Carotid u/s 1/31/22: Nonobstructive. Elevated BL ICA velocities are most likely due to tortuosity.\par \par Abd u/s 1/31/22: No AAA. Mild plauqe.\par \par CTA carotids Feb 2021 mild nonobstructive plaque, left thyroid multiple nodules, thyroid Doppler and follow-up with endocrinologist Dr Ivette Rodriguez ordered. \par

## 2023-04-20 NOTE — DISCUSSION/SUMMARY
[FreeTextEntry1] : JEROMY LARSON is a 68 year old F who presents today Apr 20, 2023 with the above history and the following active issues:\par \par Hypertension average resting home BPs at guideline goal on HCTZ lisinopril\par \par Dyslipidemia on rosuvastatin well tolerated\par \par Type 2 diabetes following with endocrine, Dr. Guillermo Tyler.\par \par Asymptomatic moderate BLANE stenosis Doppler January 2021. Nonobstructive carotid on contrast CTA February 2021, incidental finding left multiple thyroid nodules, thyroid ultrasound and follow-up with endocrinologist Dr. Ivette Rodriguez ordered. Patient now following with Dr. Tyler. On ASA and statin.\par \par Patient will have fasting labs in 3 months and carotid u/s and OV same day in 6 months.\par \par Ongoing f/u with PCP.\par \par F/U as above.\par Discussed red flag symptoms, which would warrant sooner or emergent medical evaluation.\par Any questions and concerns were addressed and resolved.\par \par Sincerely,\par Demi Mendoza [facility]-BC\par Patient's history, testing, and plan was reviewed with supervising physician, Dr. Nikia Bolanos\par \par Chronic joint pain on the Motrin\par

## 2023-05-19 ENCOUNTER — RX CHANGE (OUTPATIENT)
Age: 68
End: 2023-05-19

## 2023-10-19 ENCOUNTER — APPOINTMENT (OUTPATIENT)
Dept: CARDIOLOGY | Facility: CLINIC | Age: 68
End: 2023-10-19
Payer: MEDICARE

## 2023-10-19 VITALS
DIASTOLIC BLOOD PRESSURE: 84 MMHG | HEIGHT: 62 IN | WEIGHT: 174 LBS | SYSTOLIC BLOOD PRESSURE: 128 MMHG | HEART RATE: 92 BPM | OXYGEN SATURATION: 98 % | BODY MASS INDEX: 32.02 KG/M2

## 2023-10-19 PROCEDURE — 93880 EXTRACRANIAL BILAT STUDY: CPT

## 2023-10-19 PROCEDURE — 99214 OFFICE O/P EST MOD 30 MIN: CPT

## 2023-12-01 ENCOUNTER — OFFICE (OUTPATIENT)
Dept: URBAN - METROPOLITAN AREA CLINIC 38 | Facility: CLINIC | Age: 68
Setting detail: OPHTHALMOLOGY
End: 2023-12-01
Payer: MEDICARE

## 2023-12-01 DIAGNOSIS — H17.9: ICD-10-CM

## 2023-12-01 DIAGNOSIS — H02.402: ICD-10-CM

## 2023-12-01 DIAGNOSIS — H01.004: ICD-10-CM

## 2023-12-01 DIAGNOSIS — H01.001: ICD-10-CM

## 2023-12-01 DIAGNOSIS — H16.043: ICD-10-CM

## 2023-12-01 DIAGNOSIS — H25.13: ICD-10-CM

## 2023-12-01 DIAGNOSIS — H16.223: ICD-10-CM

## 2023-12-01 DIAGNOSIS — E11.9: ICD-10-CM

## 2023-12-01 DIAGNOSIS — H04.223: ICD-10-CM

## 2023-12-01 PROCEDURE — 92004 COMPRE OPH EXAM NEW PT 1/>: CPT | Performed by: OPHTHALMOLOGY

## 2023-12-01 ASSESSMENT — REFRACTION_CURRENTRX
OD_OVR_VA: 20/
OS_OVR_VA: 20/
OS_CYLINDER: -0.75
OS_AXIS: 074
OD_SPHERE: +1.00
OS_VPRISM_DIRECTION: PROGS
OD_CYLINDER: -0.50
OD_ADD: +2.25
OD_AXIS: 128
OS_SPHERE: +0.25
OD_VPRISM_DIRECTION: PROGS
OS_ADD: +2.00

## 2023-12-01 ASSESSMENT — LID POSITION - COMMENTS: OS_COMMENTS: 2MM

## 2023-12-01 ASSESSMENT — REFRACTION_MANIFEST
OD_VA2: 20/30(J2)
OS_VA2: 20/30(J2)
OS_CYLINDER: -0.75
OS_VA1: 20/NI
OD_SPHERE: +1.00
OD_AXIS: 130
OS_ADD: +3.00
OD_CYLINDER: -0.50
OD_ADD: +3.00
OS_SPHERE: +0.25
OS_AXIS: 075
OD_VA1: 20/NI

## 2023-12-01 ASSESSMENT — SPHEQUIV_DERIVED
OS_SPHEQUIV: -0.125
OS_SPHEQUIV: -1
OD_SPHEQUIV: 0.25
OD_SPHEQUIV: 0.75

## 2023-12-01 ASSESSMENT — REFRACTION_AUTOREFRACTION
OD_SPHERE: +0.50
OD_CYLINDER: -0.50
OS_AXIS: 061
OS_SPHERE: -0.50
OS_CYLINDER: -1.00
OD_AXIS: 154

## 2023-12-01 ASSESSMENT — SUPERFICIAL PUNCTATE KERATITIS (SPK)
OD_SPK: 3+
OS_SPK: 3+

## 2023-12-01 ASSESSMENT — LID EXAM ASSESSMENTS
OD_BLEPHARITIS: RUL 1+
OS_BLEPHARITIS: LUL 1+

## 2023-12-01 ASSESSMENT — CONFRONTATIONAL VISUAL FIELD TEST (CVF)
OS_FINDINGS: FULL
OD_FINDINGS: FULL

## 2023-12-01 ASSESSMENT — INCREASING TEAR LAKE - SEVERITY SCORE
OD_INC_TEARLAKE: 1+
OS_INC_TEARLAKE: 1+

## 2023-12-01 ASSESSMENT — LID POSITION - PTOSIS: OS_PTOSIS: LUL 1+

## 2024-04-19 ENCOUNTER — RX ONLY (RX ONLY)
Age: 69
End: 2024-04-19

## 2024-04-19 ENCOUNTER — OFFICE (OUTPATIENT)
Dept: URBAN - METROPOLITAN AREA CLINIC 8 | Facility: CLINIC | Age: 69
Setting detail: OPHTHALMOLOGY
End: 2024-04-19
Payer: MEDICARE

## 2024-04-19 DIAGNOSIS — H16.223: ICD-10-CM

## 2024-04-19 DIAGNOSIS — H02.89: ICD-10-CM

## 2024-04-19 DIAGNOSIS — H40.033: ICD-10-CM

## 2024-04-19 DIAGNOSIS — H16.043: ICD-10-CM

## 2024-04-19 PROBLEM — H02.40 PTOSIS OF EYELID, UNSPECIFIED; LEFT EYE: Status: ACTIVE | Noted: 2024-04-19

## 2024-04-19 PROCEDURE — 99213 OFFICE O/P EST LOW 20 MIN: CPT | Performed by: OPTOMETRIST

## 2024-04-19 ASSESSMENT — LID EXAM ASSESSMENTS
OD_MEIBOMITIS: RUL 2+
OS_MEIBOMITIS: LUL 2+

## 2024-04-19 ASSESSMENT — LID POSITION - COMMENTS: OS_COMMENTS: 2MM

## 2024-04-19 ASSESSMENT — LID POSITION - PTOSIS: OS_PTOSIS: LUL 1+

## 2024-05-08 PROBLEM — I65.21 STENOSIS OF RIGHT CAROTID ARTERY: Status: ACTIVE | Noted: 2021-01-12

## 2024-05-08 PROBLEM — R00.2 PALPITATIONS: Status: ACTIVE | Noted: 2022-08-23

## 2024-05-08 PROBLEM — R07.89 BURNING IN THE CHEST: Status: ACTIVE | Noted: 2018-04-25

## 2024-05-08 PROBLEM — R06.02 SOB (SHORTNESS OF BREATH): Status: ACTIVE | Noted: 2018-04-25

## 2024-05-08 PROBLEM — E04.1 LEFT THYROID NODULE: Status: ACTIVE | Noted: 2021-07-08

## 2024-05-08 PROBLEM — R07.89 CHEST TIGHTNESS: Status: ACTIVE | Noted: 2018-04-25

## 2024-05-08 PROBLEM — E78.5 HYPERLIPIDEMIA, UNSPECIFIED HYPERLIPIDEMIA TYPE: Status: ACTIVE | Noted: 2018-04-25

## 2024-05-08 PROBLEM — I10 HYPERTENSION, UNSPECIFIED TYPE: Status: ACTIVE | Noted: 2018-04-25

## 2024-05-08 PROBLEM — E11.9 TYPE 2 DIABETES MELLITUS: Status: ACTIVE | Noted: 2018-04-25

## 2024-05-15 ENCOUNTER — APPOINTMENT (OUTPATIENT)
Dept: CARDIOLOGY | Facility: CLINIC | Age: 69
End: 2024-05-15
Payer: MEDICARE

## 2024-05-15 VITALS
DIASTOLIC BLOOD PRESSURE: 78 MMHG | HEART RATE: 85 BPM | WEIGHT: 168 LBS | BODY MASS INDEX: 30.91 KG/M2 | OXYGEN SATURATION: 98 % | SYSTOLIC BLOOD PRESSURE: 128 MMHG | HEIGHT: 62 IN

## 2024-05-15 DIAGNOSIS — E78.5 HYPERLIPIDEMIA, UNSPECIFIED: ICD-10-CM

## 2024-05-15 DIAGNOSIS — I10 ESSENTIAL (PRIMARY) HYPERTENSION: ICD-10-CM

## 2024-05-15 DIAGNOSIS — E04.1 NONTOXIC SINGLE THYROID NODULE: ICD-10-CM

## 2024-05-15 DIAGNOSIS — R07.89 OTHER CHEST PAIN: ICD-10-CM

## 2024-05-15 DIAGNOSIS — E11.9 TYPE 2 DIABETES MELLITUS W/OUT COMPLICATIONS: ICD-10-CM

## 2024-05-15 DIAGNOSIS — I65.21 OCCLUSION AND STENOSIS OF RIGHT CAROTID ARTERY: ICD-10-CM

## 2024-05-15 DIAGNOSIS — R06.02 SHORTNESS OF BREATH: ICD-10-CM

## 2024-05-15 DIAGNOSIS — R00.2 PALPITATIONS: ICD-10-CM

## 2024-05-15 PROCEDURE — 99215 OFFICE O/P EST HI 40 MIN: CPT

## 2024-05-15 PROCEDURE — 93356 MYOCRD STRAIN IMG SPCKL TRCK: CPT

## 2024-05-15 PROCEDURE — G2211 COMPLEX E/M VISIT ADD ON: CPT

## 2024-05-15 PROCEDURE — 93306 TTE W/DOPPLER COMPLETE: CPT

## 2024-05-15 RX ORDER — LINAGLIPTIN AND METFORMIN HYDROCHLORIDE 2.5; 1 MG/1; MG/1
2.5-1 TABLET, FILM COATED, EXTENDED RELEASE ORAL DAILY
Refills: 0 | Status: DISCONTINUED | COMMUNITY
End: 2024-05-15

## 2024-05-15 RX ORDER — CYCLOSPORINE 0.5 MG/ML
0.05 EMULSION OPHTHALMIC TWICE DAILY
Refills: 0 | Status: ACTIVE | COMMUNITY

## 2024-05-15 RX ORDER — EMPAGLIFLOZIN 10 MG/1
10 TABLET, FILM COATED ORAL DAILY
Refills: 0 | Status: ACTIVE | COMMUNITY

## 2024-05-15 RX ORDER — IBUPROFEN 600 MG
600 TABLET ORAL
Refills: 0 | Status: DISCONTINUED | COMMUNITY
End: 2024-05-15

## 2024-05-15 RX ORDER — ORAL SEMAGLUTIDE 7 MG/1
7 TABLET ORAL DAILY
Refills: 0 | Status: ACTIVE | COMMUNITY

## 2024-05-15 RX ORDER — PIOGLITAZONE HYDROCHLORIDE 15 MG/1
15 TABLET ORAL DAILY
Refills: 0 | Status: ACTIVE | COMMUNITY

## 2024-05-15 RX ORDER — METFORMIN ER 500 MG 500 MG/1
500 TABLET ORAL TWICE DAILY
Refills: 0 | Status: ACTIVE | COMMUNITY

## 2024-05-15 NOTE — REASON FOR VISIT
[Other: ____] : [unfilled] [Consultation] : a consultation regarding [Hyperlipidemia] : hyperlipidemia [Hypertension] : hypertension [FreeTextEntry1] : Yvonne is 69-year-old female with history of hypertension, dyslipidemia, type 2 diabetes, GERD, chest pain evaluation 2011 normal MPI stress test at that time, vertigo.  Patient had prolonged episode of vertigo lasting several days improved on meclizine. No further chest pain. Cardiovascular review of symptoms is negative for exertional dyspnea, palpitations, dizziness or syncope.  No PND or orthopnea leg edema.  No bleeding or black stool.  No exercise routine.  Patient is walking 10 minutes on occasion. Patient was working with geriatric patients in a  center, now retired.  Patient spends the summer in [place] with her family.  Patient is planning senior care this year.   Echocardiogram May 2024 LVEF 55 to 60%, ascending aorta 3.6 cm, trace mild MR, mild TR, normal RVSP.  Carotid and abdominal ultrasound Oct 2023, moderate nonobstructive plaque, normal abdominal aortic size.  Left thyroid cyst seen, thyroid ultrasound and endocrine follow-up ordered  Zio patch 1 week heart monitor Aug 2022 sinus rhythm average heart rate 78 bpm, rare PACs PVCs, 4 beat WCT  Lexiscan Myoview stress test Feb 2022, LVEF 66%, normal perfusion, diaphragm attenuation seen, nonischemic EKG response, baseline sinus rhythm NSST  Echocardiogram Jan 2022 LVEF 55%, mild MR and TR, normal RVSP, ascending aorta 3.5 cm.  Carotid and abdominal ultrasound Jan 2022, mild nonobstructive plaque, normal abdominal aortic size.   CTA carotids Feb 2021 mild nonobstructive plaque, left thyroid multiple nodules, thyroid Doppler and follow-up with endocrinologist Dr Ivette Rodriguez ordered.   Echocardiogram Jan 2021, LVEF 55%, mild MR, normal RVSP  Carotid Doppler Jan 2021, R ICA 50 to 69% stenosis, LICA nonobstructive, normal abdominal aortic size  Echocardiogram September 2019, LVEF 55%, mild MR and TR, normal RVSP  Exercise Myoview stress test May 2018, LVEF 60%, normal perfusion, no ischemic EKG response, no chest pain, 103% MPHR, 5 minutes Corbin protocol.  Echocardiogram May 2018, LVEF 60%, mild MR, mild to moderate TR, mild pulmonary hypertension, RVSP 35 mmHg  Carotid and abdominal ultrasound May 2018, mild nonobstructive plaque, normal abdominal aortic size.  EKG 4/25/18, sinus rhythm, low voltage, NSST  Exercise Myoview stress test 2011 normal perfusion    [FreeTextEntry2] : HTN, HL, T2DM, ACP

## 2024-05-15 NOTE — DISCUSSION/SUMMARY
[FreeTextEntry1] : Patient has medical history detailed above and active medical issues including:  - No anginal symptoms, normal perfusion Myoview stress test with normal LVEF Feb 2022  - Hypertension, resting BP at guideline goal on lisinopril.  HCTZ  - Hyperlipidemia on rosuvastatin well-tolerated  - Thyroid nodules evaluated in the past  Advised patient to follow active lifestyle with regular cardiovascular exercise. Patient educated on heart healthy diet. Recommend increased oral hydration with electrolyte supplement drinks, avoid excess alcohol and caffeine.  Patient is aware to call with any symptoms or concerns.   Cardiology follow-up in 1 year with echocardiogram  Yvonne will follow-up with Dr. Sav Rodriguez for primary care  Total time spent 45 minutes, reviewing of test results, chart information, patient discussion, physical exam and completion of chart documentation.

## 2024-08-16 ENCOUNTER — OFFICE (OUTPATIENT)
Dept: URBAN - METROPOLITAN AREA CLINIC 8 | Facility: CLINIC | Age: 69
Setting detail: OPHTHALMOLOGY
End: 2024-08-16
Payer: MEDICARE

## 2024-08-16 DIAGNOSIS — H02.89: ICD-10-CM

## 2024-08-16 DIAGNOSIS — H16.043: ICD-10-CM

## 2024-08-16 DIAGNOSIS — H16.223: ICD-10-CM

## 2024-08-16 PROCEDURE — 99213 OFFICE O/P EST LOW 20 MIN: CPT | Performed by: OPTOMETRIST

## 2024-08-16 ASSESSMENT — LID POSITION - PTOSIS: OS_PTOSIS: LUL 1+

## 2024-08-16 ASSESSMENT — LID POSITION - COMMENTS: OS_COMMENTS: 2MM

## 2024-08-16 ASSESSMENT — CONFRONTATIONAL VISUAL FIELD TEST (CVF)
OD_FINDINGS: FULL
OS_FINDINGS: FULL

## 2024-08-16 ASSESSMENT — LID EXAM ASSESSMENTS
OS_MEIBOMITIS: LUL 2+
OD_MEIBOMITIS: RUL 2+

## 2024-08-23 ENCOUNTER — OFFICE (OUTPATIENT)
Dept: URBAN - METROPOLITAN AREA CLINIC 8 | Facility: CLINIC | Age: 69
Setting detail: OPHTHALMOLOGY
End: 2024-08-23
Payer: MEDICARE

## 2024-08-23 DIAGNOSIS — H16.223: ICD-10-CM

## 2024-08-23 PROBLEM — H16.323: Status: ACTIVE | Noted: 2024-08-23

## 2024-08-23 PROCEDURE — 99213 OFFICE O/P EST LOW 20 MIN: CPT | Performed by: OPTOMETRIST

## 2024-08-23 ASSESSMENT — LID POSITION - PTOSIS: OS_PTOSIS: LUL 1+

## 2024-08-23 ASSESSMENT — LID EXAM ASSESSMENTS
OS_MEIBOMITIS: LUL 2+
OD_MEIBOMITIS: RUL 2+

## 2024-08-23 ASSESSMENT — LID POSITION - COMMENTS: OS_COMMENTS: 2MM

## 2024-08-23 ASSESSMENT — CONFRONTATIONAL VISUAL FIELD TEST (CVF)
OS_FINDINGS: FULL
OD_FINDINGS: FULL

## 2024-09-13 ENCOUNTER — OFFICE (OUTPATIENT)
Dept: URBAN - METROPOLITAN AREA CLINIC 8 | Facility: CLINIC | Age: 69
Setting detail: OPHTHALMOLOGY
End: 2024-09-13
Payer: MEDICARE

## 2024-09-13 DIAGNOSIS — H16.223: ICD-10-CM

## 2024-09-13 DIAGNOSIS — H16.323: ICD-10-CM

## 2024-09-13 PROCEDURE — 99213 OFFICE O/P EST LOW 20 MIN: CPT | Performed by: OPTOMETRIST

## 2024-09-13 ASSESSMENT — CONFRONTATIONAL VISUAL FIELD TEST (CVF)
OS_FINDINGS: FULL
OD_FINDINGS: FULL

## 2024-09-13 ASSESSMENT — LID EXAM ASSESSMENTS
OD_MEIBOMITIS: RUL 2+
OS_MEIBOMITIS: LUL 2+

## 2024-09-13 ASSESSMENT — LID POSITION - COMMENTS: OS_COMMENTS: 2MM

## 2024-09-13 ASSESSMENT — LID POSITION - PTOSIS: OS_PTOSIS: LUL 1+

## 2024-11-15 ENCOUNTER — OFFICE (OUTPATIENT)
Dept: URBAN - METROPOLITAN AREA CLINIC 8 | Facility: CLINIC | Age: 69
Setting detail: OPHTHALMOLOGY
End: 2024-11-15
Payer: MEDICARE

## 2024-11-15 DIAGNOSIS — H16.223: ICD-10-CM

## 2024-11-15 PROBLEM — H17.9 CORNEAL SCAR: Status: ACTIVE | Noted: 2024-11-15

## 2024-11-15 PROCEDURE — 99213 OFFICE O/P EST LOW 20 MIN: CPT | Performed by: OPTOMETRIST

## 2024-11-15 ASSESSMENT — REFRACTION_MANIFEST
OS_VA1: 20/NI
OS_AXIS: 075
OS_VA2: 20/30(J2)
OD_AXIS: 130
OD_VA2: 20/30(J2)
OD_VA1: 20/NI
OD_SPHERE: +1.00
OD_ADD: +3.00
OS_SPHERE: +0.25
OS_ADD: +3.00
OS_CYLINDER: -0.75
OD_CYLINDER: -0.50

## 2024-11-15 ASSESSMENT — KERATOMETRY
OS_AXISANGLE_DEGREES: 100
OD_AXISANGLE_DEGREES: 045
METHOD_AUTO_MANUAL: AUTO
OD_K1POWER_DIOPTERS: 45.25
OS_K1POWER_DIOPTERS: 45.50
OS_K2POWER_DIOPTERS: 46.25
OD_K2POWER_DIOPTERS: 45.75

## 2024-11-15 ASSESSMENT — REFRACTION_CURRENTRX
OD_CYLINDER: -0.50
OD_OVR_VA: 20/
OS_AXIS: 075
OS_SPHERE: +0.25
OS_CYLINDER: -0.75
OD_VPRISM_DIRECTION: PROGS
OD_SPHERE: +1.00
OD_AXIS: 128
OD_ADD: +2.25
OS_ADD: +2.25
OS_VPRISM_DIRECTION: PROGS
OS_OVR_VA: 20/

## 2024-11-15 ASSESSMENT — LID EXAM ASSESSMENTS
OD_MEIBOMITIS: RUL 2+
OS_MEIBOMITIS: LUL 2+

## 2024-11-15 ASSESSMENT — REFRACTION_AUTOREFRACTION
OS_CYLINDER: -0.75
OD_AXIS: 113
OS_SPHERE: -0.50
OD_SPHERE: +0.75
OD_CYLINDER: -0.75
OS_AXIS: 059

## 2024-11-15 ASSESSMENT — LID POSITION - PTOSIS: OS_PTOSIS: LUL 1+

## 2024-11-15 ASSESSMENT — CONFRONTATIONAL VISUAL FIELD TEST (CVF)
OD_FINDINGS: FULL
OS_FINDINGS: FULL

## 2024-11-15 ASSESSMENT — VISUAL ACUITY
OS_BCVA: 20/60
OD_BCVA: 20/70

## 2024-11-15 ASSESSMENT — SUPERFICIAL PUNCTATE KERATITIS (SPK)
OD_SPK: T
OS_SPK: 1+

## 2024-11-15 ASSESSMENT — CORNEAL EDEMA CLINICAL DESCRIPTION: OS_CORNEALEDEMA: T

## 2024-11-15 ASSESSMENT — LID POSITION - COMMENTS: OS_COMMENTS: 2MM

## 2025-05-16 ENCOUNTER — OFFICE (OUTPATIENT)
Dept: URBAN - METROPOLITAN AREA CLINIC 8 | Facility: CLINIC | Age: 70
Setting detail: OPHTHALMOLOGY
End: 2025-05-16
Payer: MEDICARE

## 2025-05-16 DIAGNOSIS — H40.033: ICD-10-CM

## 2025-05-16 DIAGNOSIS — H16.223: ICD-10-CM

## 2025-05-16 DIAGNOSIS — H00.14: ICD-10-CM

## 2025-05-16 DIAGNOSIS — H25.13: ICD-10-CM

## 2025-05-16 PROBLEM — H02.402 PTOSIS OF EYELID, UNSPECIFIED; LEFT EYE: Status: ACTIVE | Noted: 2025-05-16

## 2025-05-16 PROCEDURE — 92014 COMPRE OPH EXAM EST PT 1/>: CPT | Performed by: OPTOMETRIST

## 2025-05-16 ASSESSMENT — REFRACTION_MANIFEST
OS_VA1: 20/NI
OS_VA2: 20/30(J2)
OD_SPHERE: +1.00
OD_VA1: 20/NI
OD_CYLINDER: -0.50
OS_CYLINDER: -0.75
OS_ADD: +3.00
OS_AXIS: 075
OS_SPHERE: +0.25
OD_VA2: 20/30(J2)
OD_ADD: +3.00
OD_AXIS: 130

## 2025-05-16 ASSESSMENT — SUPERFICIAL PUNCTATE KERATITIS (SPK)
OD_SPK: 1+ 2+
OS_SPK: 1+ 2+

## 2025-05-16 ASSESSMENT — KERATOMETRY
METHOD_AUTO_MANUAL: AUTO
OD_K2POWER_DIOPTERS: 46.25
OS_K2POWER_DIOPTERS: 46.00
OD_K1POWER_DIOPTERS: 45.25
OS_AXISANGLE_DEGREES: 124
OD_AXISANGLE_DEGREES: 066
OS_K1POWER_DIOPTERS: 45.50

## 2025-05-16 ASSESSMENT — REFRACTION_CURRENTRX
OD_ADD: +2.25
OS_CYLINDER: -0.75
OS_VPRISM_DIRECTION: PROGS
OD_CYLINDER: -0.50
OD_VPRISM_DIRECTION: PROGS
OD_AXIS: 137
OS_SPHERE: +0.25
OS_AXIS: 087
OD_SPHERE: +1.00
OS_ADD: +2.25
OS_OVR_VA: 20/
OD_OVR_VA: 20/

## 2025-05-16 ASSESSMENT — CORNEAL EDEMA CLINICAL DESCRIPTION: OS_CORNEALEDEMA: T

## 2025-05-16 ASSESSMENT — VISUAL ACUITY
OD_BCVA: 20/70
OS_BCVA: 20/50

## 2025-05-16 ASSESSMENT — CONFRONTATIONAL VISUAL FIELD TEST (CVF)
OS_FINDINGS: FULL
OD_FINDINGS: FULL

## 2025-05-16 ASSESSMENT — REFRACTION_AUTOREFRACTION
OD_AXIS: 112
OD_CYLINDER: -0.75
OS_AXIS: 058
OS_CYLINDER: -1.25
OS_SPHERE: -0.75
OD_SPHERE: +0.75

## 2025-05-16 ASSESSMENT — LID POSITION - COMMENTS: OS_COMMENTS: 2MM

## 2025-05-16 ASSESSMENT — LID POSITION - PTOSIS: OS_PTOSIS: LUL 1+

## 2025-05-21 ENCOUNTER — NON-APPOINTMENT (OUTPATIENT)
Age: 70
End: 2025-05-21

## 2025-05-21 ENCOUNTER — APPOINTMENT (OUTPATIENT)
Dept: CARDIOLOGY | Facility: CLINIC | Age: 70
End: 2025-05-21
Payer: MEDICARE

## 2025-05-21 VITALS
WEIGHT: 161 LBS | HEART RATE: 105 BPM | OXYGEN SATURATION: 98 % | DIASTOLIC BLOOD PRESSURE: 90 MMHG | SYSTOLIC BLOOD PRESSURE: 160 MMHG | HEIGHT: 62 IN | BODY MASS INDEX: 29.63 KG/M2

## 2025-05-21 DIAGNOSIS — E04.1 NONTOXIC SINGLE THYROID NODULE: ICD-10-CM

## 2025-05-21 DIAGNOSIS — I10 ESSENTIAL (PRIMARY) HYPERTENSION: ICD-10-CM

## 2025-05-21 DIAGNOSIS — R00.2 PALPITATIONS: ICD-10-CM

## 2025-05-21 DIAGNOSIS — E11.9 TYPE 2 DIABETES MELLITUS W/OUT COMPLICATIONS: ICD-10-CM

## 2025-05-21 DIAGNOSIS — I65.21 OCCLUSION AND STENOSIS OF RIGHT CAROTID ARTERY: ICD-10-CM

## 2025-05-21 DIAGNOSIS — R06.02 SHORTNESS OF BREATH: ICD-10-CM

## 2025-05-21 DIAGNOSIS — E78.5 HYPERLIPIDEMIA, UNSPECIFIED: ICD-10-CM

## 2025-05-21 DIAGNOSIS — R07.89 OTHER CHEST PAIN: ICD-10-CM

## 2025-05-21 LAB — HBA1C MFR BLD HPLC: 7.3

## 2025-05-21 PROCEDURE — 99215 OFFICE O/P EST HI 40 MIN: CPT

## 2025-05-21 RX ORDER — NEOMYCIN SULFATE, POLYMYXIN B SULFATE AND DEXAMETHASONE 1; 3.5; 1 MG/G; MG/G; [USP'U]/G
3.5-10000-0.1 OINTMENT OPHTHALMIC
Refills: 0 | Status: ACTIVE | COMMUNITY

## 2025-06-11 ENCOUNTER — OFFICE (OUTPATIENT)
Dept: URBAN - METROPOLITAN AREA CLINIC 8 | Facility: CLINIC | Age: 70
Setting detail: OPHTHALMOLOGY
End: 2025-06-11
Payer: MEDICARE

## 2025-06-11 ENCOUNTER — RX ONLY (RX ONLY)
Age: 70
End: 2025-06-11

## 2025-06-11 DIAGNOSIS — H17.9: ICD-10-CM

## 2025-06-11 DIAGNOSIS — H25.13: ICD-10-CM

## 2025-06-11 DIAGNOSIS — H40.033: ICD-10-CM

## 2025-06-11 DIAGNOSIS — H02.402: ICD-10-CM

## 2025-06-11 DIAGNOSIS — E11.9: ICD-10-CM

## 2025-06-11 DIAGNOSIS — H16.223: ICD-10-CM

## 2025-06-11 PROCEDURE — 99214 OFFICE O/P EST MOD 30 MIN: CPT | Performed by: OPHTHALMOLOGY

## 2025-06-11 ASSESSMENT — REFRACTION_MANIFEST
OD_AXIS: 100
OS_ADD: +3.00
OS_CYLINDER: -0.75
OD_CYLINDER: -0.75
OD_ADD: +3.00
OD_SPHERE: +1.00
OD_VA2: 20/30(J2)
OS_VA2: 20/30(J2)
OS_AXIS: 060
OD_VA1: 20/70
OS_VA1: 20/70
OS_SPHERE: -1.00

## 2025-06-11 ASSESSMENT — REFRACTION_AUTOREFRACTION
OD_AXIS: 098
OS_SPHERE: -1.00
OS_CYLINDER: -0.75
OD_CYLINDER: -0.75
OD_SPHERE: +0.50
OS_AXIS: 060

## 2025-06-11 ASSESSMENT — REFRACTION_CURRENTRX
OS_AXIS: 087
OD_ADD: +2.25
OS_CYLINDER: -0.75
OS_ADD: +2.25
OS_VPRISM_DIRECTION: PROGS
OD_VPRISM_DIRECTION: PROGS
OD_CYLINDER: -0.50
OS_SPHERE: +0.25
OD_SPHERE: +1.00
OS_OVR_VA: 20/
OD_OVR_VA: 20/
OD_AXIS: 137

## 2025-06-11 ASSESSMENT — KERATOMETRY
OD_K1POWER_DIOPTERS: 45.50
OD_K2POWER_DIOPTERS: 45.50
OS_K2POWER_DIOPTERS: 44.50
OD_AXISANGLE_DEGREES: 090
METHOD_AUTO_MANUAL: AUTO
OS_AXISANGLE_DEGREES: 102
OS_K1POWER_DIOPTERS: 44.00

## 2025-06-11 ASSESSMENT — LID POSITION - COMMENTS: OS_COMMENTS: 2MM

## 2025-06-11 ASSESSMENT — SUPERFICIAL PUNCTATE KERATITIS (SPK)
OD_SPK: 1+ 2+
OS_SPK: 1+ 2+

## 2025-06-11 ASSESSMENT — LID POSITION - PTOSIS: OS_PTOSIS: LUL 1+

## 2025-06-11 ASSESSMENT — VISUAL ACUITY
OD_BCVA: 20/70
OS_BCVA: 20/70

## 2025-06-11 ASSESSMENT — CONFRONTATIONAL VISUAL FIELD TEST (CVF)
OS_FINDINGS: FULL
OD_FINDINGS: FULL

## 2025-06-12 PROBLEM — H04.123 DRY EYE SYNDROME LACRIMAL GLAND; BOTH EYES: Status: ACTIVE | Noted: 2025-06-12

## 2025-07-23 ENCOUNTER — OFFICE (OUTPATIENT)
Dept: URBAN - METROPOLITAN AREA CLINIC 8 | Facility: CLINIC | Age: 70
Setting detail: OPHTHALMOLOGY
End: 2025-07-23
Payer: MEDICARE

## 2025-07-23 DIAGNOSIS — H25.13: ICD-10-CM

## 2025-07-23 DIAGNOSIS — H25.11: ICD-10-CM

## 2025-07-23 PROCEDURE — 99213 OFFICE O/P EST LOW 20 MIN: CPT | Performed by: OPHTHALMOLOGY

## 2025-07-23 PROCEDURE — 92136 OPHTHALMIC BIOMETRY: CPT | Mod: TC | Performed by: OPHTHALMOLOGY

## 2025-07-23 PROCEDURE — 92136 OPHTHALMIC BIOMETRY: CPT | Mod: 26,RT | Performed by: OPHTHALMOLOGY

## 2025-07-23 ASSESSMENT — REFRACTION_AUTOREFRACTION
OD_AXIS: 105
OD_SPHERE: +0.75
OD_CYLINDER: -1.00
OS_SPHERE: -0.50
OS_CYLINDER: -1.75
OS_AXIS: 053

## 2025-07-23 ASSESSMENT — LID POSITION - PTOSIS: OS_PTOSIS: LUL 1+

## 2025-07-23 ASSESSMENT — REFRACTION_MANIFEST
OU_VA: 20/70
OD_VA1: 20/70
OS_SPHERE: -1.00
OS_AXIS: 060
OS_VA2: 20/30(J2)
OD_AXIS: 100
OD_ADD: +3.00
OS_CYLINDER: -0.75
OD_VA2: 20/30(J2)
OS_VA1: 20/70
OD_SPHERE: +1.00
OS_ADD: +3.00
OD_CYLINDER: -0.75

## 2025-07-23 ASSESSMENT — CONFRONTATIONAL VISUAL FIELD TEST (CVF)
OS_FINDINGS: FULL
OD_FINDINGS: FULL

## 2025-07-23 ASSESSMENT — LID POSITION - COMMENTS: OS_COMMENTS: 2MM

## 2025-07-23 ASSESSMENT — SUPERFICIAL PUNCTATE KERATITIS (SPK)
OS_SPK: 1+ 2+
OD_SPK: 1+ 2+

## 2025-07-23 ASSESSMENT — KERATOMETRY
OD_AXISANGLE_DEGREES: 046
OS_K1POWER_DIOPTERS: 42.50
OD_K1POWER_DIOPTERS: 45.50
OD_K2POWER_DIOPTERS: 46.00
METHOD_AUTO_MANUAL: AUTO
OS_AXISANGLE_DEGREES: 114
OS_K2POWER_DIOPTERS: 46.00

## 2025-07-23 ASSESSMENT — REFRACTION_CURRENTRX
OS_OVR_VA: 20/
OS_SPHERE: +0.25
OS_VPRISM_DIRECTION: PROGS
OS_CYLINDER: -0.75
OS_ADD: +2.25
OD_AXIS: 137
OD_CYLINDER: -0.50
OS_AXIS: 087
OD_VPRISM_DIRECTION: PROGS
OD_ADD: +2.25
OD_OVR_VA: 20/
OD_SPHERE: +1.00

## 2025-07-23 ASSESSMENT — VISUAL ACUITY
OD_BCVA: 20/70
OS_BCVA: 20/70

## 2025-08-19 ENCOUNTER — OUTPATIENT HOSPITAL (OUTPATIENT)
Dept: URBAN - METROPOLITAN AREA CLINIC 7 | Facility: CLINIC | Age: 70
Setting detail: OPHTHALMOLOGY
End: 2025-08-19
Payer: MEDICARE

## 2025-08-19 DIAGNOSIS — H25.11: ICD-10-CM

## 2025-08-19 PROCEDURE — 66984 XCAPSL CTRC RMVL W/O ECP: CPT | Mod: RT | Performed by: OPHTHALMOLOGY

## 2025-08-20 ENCOUNTER — RX ONLY (RX ONLY)
Age: 70
End: 2025-08-20

## 2025-08-20 ENCOUNTER — OFFICE (OUTPATIENT)
Dept: URBAN - METROPOLITAN AREA CLINIC 8 | Facility: CLINIC | Age: 70
Setting detail: OPHTHALMOLOGY
End: 2025-08-20
Payer: MEDICARE

## 2025-08-20 DIAGNOSIS — H25.12: ICD-10-CM

## 2025-08-20 PROBLEM — Z96.1 PSEUDOPHAKIA: POST OP DAY 1: Status: ACTIVE | Noted: 2025-08-20

## 2025-08-20 PROCEDURE — 92014 COMPRE OPH EXAM EST PT 1/>: CPT | Performed by: OPHTHALMOLOGY

## 2025-08-20 ASSESSMENT — SUPERFICIAL PUNCTATE KERATITIS (SPK)
OS_SPK: 1+ 2+
OD_SPK: 1+ 2+

## 2025-08-20 ASSESSMENT — REFRACTION_MANIFEST
OD_CYLINDER: -0.75
OS_ADD: +3.00
OD_ADD: +3.00
OS_CYLINDER: -0.75
OD_VA1: 20/70
OS_SPHERE: -1.00
OD_AXIS: 100
OD_VA2: 20/30(J2)
OS_VA1: 20/70
OS_AXIS: 060
OS_VA2: 20/30(J2)
OD_SPHERE: +1.00
OU_VA: 20/70

## 2025-08-20 ASSESSMENT — REFRACTION_CURRENTRX
OS_SPHERE: +0.25
OD_OVR_VA: 20/
OD_SPHERE: +1.00
OS_ADD: +2.25
OD_CYLINDER: -0.50
OD_ADD: +2.25
OS_OVR_VA: 20/
OS_CYLINDER: -0.75
OD_AXIS: 137
OS_VPRISM_DIRECTION: PROGS
OS_AXIS: 087
OD_VPRISM_DIRECTION: PROGS

## 2025-08-20 ASSESSMENT — KERATOMETRY
OS_K2POWER_DIOPTERS: 45.75
METHOD_AUTO_MANUAL: AUTO
OS_K1POWER_DIOPTERS: 45.50
OD_AXISANGLE_DEGREES: 077
OD_K1POWER_DIOPTERS: 45.25
OD_K2POWER_DIOPTERS: 46.25
OS_AXISANGLE_DEGREES: 079

## 2025-08-20 ASSESSMENT — REFRACTION_AUTOREFRACTION
OS_AXIS: 061
OD_CYLINDER: -0.75
OS_CYLINDER: -1.00
OD_SPHERE: +0.50
OD_AXIS: 135
OS_SPHERE: -1.25

## 2025-08-20 ASSESSMENT — LID POSITION - PTOSIS: OS_PTOSIS: LUL 1+

## 2025-08-20 ASSESSMENT — LID POSITION - COMMENTS: OS_COMMENTS: 2MM

## 2025-08-20 ASSESSMENT — CONFRONTATIONAL VISUAL FIELD TEST (CVF)
OS_FINDINGS: FULL
OD_FINDINGS: FULL

## 2025-08-20 ASSESSMENT — CORNEAL EDEMA CLINICAL DESCRIPTION: OD_CORNEALEDEMA: @ INCISION

## 2025-08-20 ASSESSMENT — VISUAL ACUITY
OD_BCVA: 20/70
OS_BCVA: 20/30-